# Patient Record
Sex: MALE | Race: BLACK OR AFRICAN AMERICAN | NOT HISPANIC OR LATINO | Employment: UNEMPLOYED | URBAN - METROPOLITAN AREA
[De-identification: names, ages, dates, MRNs, and addresses within clinical notes are randomized per-mention and may not be internally consistent; named-entity substitution may affect disease eponyms.]

---

## 2023-12-21 ENCOUNTER — HOSPITAL ENCOUNTER (EMERGENCY)
Facility: HOSPITAL | Age: 28
Discharge: HOME | End: 2023-12-21
Attending: EMERGENCY MEDICINE
Payer: MEDICAID

## 2023-12-21 VITALS
OXYGEN SATURATION: 99 % | BODY MASS INDEX: 23.7 KG/M2 | HEART RATE: 76 BPM | RESPIRATION RATE: 17 BRPM | SYSTOLIC BLOOD PRESSURE: 119 MMHG | WEIGHT: 160 LBS | DIASTOLIC BLOOD PRESSURE: 79 MMHG | HEIGHT: 69 IN | TEMPERATURE: 98 F

## 2023-12-21 DIAGNOSIS — J02.0 STREP PHARYNGITIS: Primary | ICD-10-CM

## 2023-12-21 LAB
GROUP A STREP, MOLECULAR: POSITIVE
INFLUENZA A, MOLECULAR: NEGATIVE
INFLUENZA B, MOLECULAR: NEGATIVE
SARS-COV-2 RDRP RESP QL NAA+PROBE: NEGATIVE
SPECIMEN SOURCE: NORMAL

## 2023-12-21 PROCEDURE — 96372 THER/PROPH/DIAG INJ SC/IM: CPT

## 2023-12-21 PROCEDURE — 87651 STREP A DNA AMP PROBE: CPT

## 2023-12-21 PROCEDURE — 99284 EMERGENCY DEPT VISIT MOD MDM: CPT

## 2023-12-21 PROCEDURE — U0002 COVID-19 LAB TEST NON-CDC: HCPCS

## 2023-12-21 PROCEDURE — 87502 INFLUENZA DNA AMP PROBE: CPT

## 2023-12-21 PROCEDURE — 25000003 PHARM REV CODE 250

## 2023-12-21 PROCEDURE — 63600175 PHARM REV CODE 636 W HCPCS

## 2023-12-21 RX ORDER — ACETAMINOPHEN 500 MG
1000 TABLET ORAL
Status: COMPLETED | OUTPATIENT
Start: 2023-12-21 | End: 2023-12-21

## 2023-12-21 RX ORDER — DEXAMETHASONE SODIUM PHOSPHATE 4 MG/ML
8 INJECTION, SOLUTION INTRA-ARTICULAR; INTRALESIONAL; INTRAMUSCULAR; INTRAVENOUS; SOFT TISSUE
Status: COMPLETED | OUTPATIENT
Start: 2023-12-21 | End: 2023-12-21

## 2023-12-21 RX ORDER — AMOXICILLIN 500 MG/1
500 CAPSULE ORAL 2 TIMES DAILY
Qty: 20 CAPSULE | Refills: 0 | Status: SHIPPED | OUTPATIENT
Start: 2023-12-21 | End: 2023-12-31

## 2023-12-21 RX ADMIN — ACETAMINOPHEN 1000 MG: 500 TABLET ORAL at 10:12

## 2023-12-21 RX ADMIN — DEXAMETHASONE SODIUM PHOSPHATE 8 MG: 4 INJECTION, SOLUTION INTRA-ARTICULAR; INTRALESIONAL; INTRAMUSCULAR; INTRAVENOUS; SOFT TISSUE at 12:12

## 2023-12-21 NOTE — ED PROVIDER NOTES
Encounter Date: 12/21/2023       History     Chief Complaint   Patient presents with    Generalized Body Aches     X 2 DAYS    Chills    Sore Throat     Patient is a 28 y.o. male with no significant past medical history who presents to ED via self for concern for sore throat which began 2 day(s) ago.  Patient reports he was head so he was a and generalized body aches with a hot and cold flashes for the last 2 days.  Patient denies cough congestion or runny nose.  Patient denies vomiting.  Patient reports he would an episode of diarrhea last night and today which reports he was loose but denies blood in his stool.  Patient denies dysuria or abdominal pain, chest pain, shortness of breath.  Patient reports it was painful swallow.  Patient complains of pain in his throat worse on the left than the right.  Patient denies neck pain or stiffness.  Patient is awake and alert in no acute distress.         Review of patient's allergies indicates:  No Known Allergies  No past medical history on file.  No past surgical history on file.  No family history on file.  Social History     Tobacco Use    Smoking status: Every Day     Types: Cigarettes    Smokeless tobacco: Never   Substance Use Topics    Alcohol use: Yes    Drug use: Not Currently     Review of Systems   Constitutional:  Positive for chills. Negative for fever.   HENT:  Positive for ear pain, sore throat and trouble swallowing. Negative for congestion, drooling, ear discharge, facial swelling, rhinorrhea, sinus pressure, sinus pain and voice change.    Eyes: Negative.    Respiratory: Negative.  Negative for cough, choking, chest tightness and shortness of breath.    Cardiovascular:  Negative for chest pain.   Gastrointestinal:  Positive for diarrhea. Negative for abdominal distention, abdominal pain, nausea and vomiting.   Genitourinary: Negative.  Negative for dysuria.   Musculoskeletal:  Negative for back pain, neck pain and neck stiffness.   Skin:  Negative for  color change, pallor and rash.   Neurological: Negative.  Negative for weakness.   Hematological:  Does not bruise/bleed easily.   Psychiatric/Behavioral: Negative.         Physical Exam     Initial Vitals [12/21/23 0901]   BP Pulse Resp Temp SpO2   124/74 81 16 98.2 °F (36.8 °C) 96 %      MAP       --         Physical Exam    Nursing note and vitals reviewed.  Constitutional: He appears well-developed and well-nourished. He is not diaphoretic.  Non-toxic appearance. He does not have a sickly appearance. He does not appear ill. No distress.   HENT:   Head: Normocephalic and atraumatic.   Right Ear: Tympanic membrane, external ear and ear canal normal. No mastoid tenderness.   Left Ear: External ear normal. No drainage or tenderness. No mastoid tenderness. Tympanic membrane is erythematous.   Mouth/Throat: Uvula is midline and mucous membranes are normal. Oropharyngeal exudate and posterior oropharyngeal erythema present. No tonsillar abscesses.   Eyes: Conjunctivae and EOM are normal. Right eye exhibits no discharge. Left eye exhibits no discharge.   Neck: Trachea normal. Neck supple. No stridor present.       Normal range of motion.  Cardiovascular:  Normal rate, regular rhythm, normal heart sounds and intact distal pulses.     Exam reveals no gallop and no friction rub.       No murmur heard.  Pulmonary/Chest: Breath sounds normal. No respiratory distress. He has no wheezes. He has no rhonchi. He has no rales. He exhibits no tenderness.   Musculoskeletal:         General: Normal range of motion.      Cervical back: Normal range of motion and neck supple. No edema, erythema or rigidity. No spinous process tenderness. Normal range of motion.     Neurological: He is alert and oriented to person, place, and time. He has normal strength. GCS score is 15. GCS eye subscore is 4. GCS verbal subscore is 5. GCS motor subscore is 6.   Skin: Skin is warm and dry. Capillary refill takes less than 2 seconds.   Psychiatric: He  has a normal mood and affect. His behavior is normal. Judgment and thought content normal.         ED Course   Procedures  Labs Reviewed   GROUP A STREP, MOLECULAR - Abnormal; Notable for the following components:       Result Value    Group A Strep, Molecular Positive (*)     All other components within normal limits   INFLUENZA A AND B ANTIGEN    Narrative:     Specimen Source->Nasopharyngeal Swab   SARS-COV-2 RNA AMPLIFICATION, QUAL          Imaging Results    None          Medications   acetaminophen tablet 1,000 mg (1,000 mg Oral Given 12/21/23 1027)   dexAMETHasone injection 8 mg (8 mg Intramuscular Given 12/21/23 1247)     Medical Decision Making  [facility]    Patient presents for emergent evaluation of acute sore throat that poses a possible threat to life and/or bodily function.    Differential diagnosis included but not limited to strep, COVID, influenza, epiglottitis, peritonsillar abscess.  In the ED patient found to have acute clear lung sounds bilaterally with no increased work of breathing.  Patient has pain to palpation of left side of throat with no significant swelling or erythema.  Patient has bilateral equal tonsillar swelling with pustular exudate noted to both tonsils.  There has no peritonsillar abscess visualized.  Patient able to lie flat and have conversation without difficulty breathing or drooling.  Patient has some mild erythema noted to left TM and right TM within normal limits.  Patient is awake and alert in no acute distress.      Discharge MDM  I discussed the patient presentation labs, findings with my attending Dr. So who individually evaluated the patient.    Patient was managed in the ED with oral Tylenol and IM dexamethasone.    The response to treatment was good.   Patient was strep positive.  Will discharge with the amoxicillin.    Patient was discharged in stable condition with close follow up with primary care.  Detailed return precautions discussed to return to the ED for  worsening pain, inability to swallow, difficulty breathing, shortness of breath, fever not responding to medication, or any new or worsening concerns.  Patient states understanding.    Amount and/or Complexity of Data Reviewed  Labs: ordered.    Risk  OTC drugs.  Prescription drug management.              Attending Attestation:     Physician Attestation Statement for NP/PA:       Other NP/PA Attestation Additions:    History of Present Illness: Patient complains of sore throat.   Physical Exam: Patient does have exudative pharyngitis.  Tonsils symmetric, uvula is midline.  Patient is laying flat in bed with absolutely no distress.   Medical Decision Making: Patient presents with exudative pharyngitis.  No concern for abscess.  Patient is strep positive.  Decadron given.  Will begin antibiotics as outpatient.                                    Clinical Impression:  Final diagnoses:  [J02.0] Strep pharyngitis (Primary)          ED Disposition Condition    Discharge Stable          ED Prescriptions       Medication Sig Dispense Start Date End Date Auth. Provider    amoxicillin (AMOXIL) 500 MG capsule Take 1 capsule (500 mg total) by mouth 2 (two) times a day. for 10 days 20 capsule 12/21/2023 12/31/2023 Kasey Hand NP          Follow-up Information       Follow up With Specialties Details Why Contact Info Additional Information    [facility]  Schedule an appointment as soon as possible for a visit  For primary care, For recheck/continuing care [address] [address]  [phone]       [facility] - Emergency Dept Emergency Medicine  If symptoms worsen [address] [id number] [phone] 1st floor             Kasey Hand NP  12/21/23 8897       Lex So MD  12/21/23 7520

## 2023-12-21 NOTE — DISCHARGE INSTRUCTIONS
Please take antibiotics as prescribed until gone.  Please follow up with primary care provider for further evaluation rechecked.  Please return to the ED for worsening throat pain, inability to swallow, drooling, difficulty breathing, shortness of breath, fever not responding to medication, neck stiffness, or any new or worsening concerns.

## 2023-12-21 NOTE — Clinical Note
"Valdo"Julia Bal was seen and treated in our emergency department on 12/21/2023.  He may return to work on 12/24/2023.       If you have any questions or concerns, please don't hesitate to call.      KULDEEP oS MD NP    "

## 2023-12-21 NOTE — Clinical Note
"Valdo"Julia Bal was seen and treated in our emergency department on 12/21/2023.  He may return to work on 12/24/2023.       If you have any questions or concerns, please don't hesitate to call.      KULDEEP So MD LPN    "